# Patient Record
Sex: FEMALE | Race: BLACK OR AFRICAN AMERICAN | Employment: OTHER | URBAN - METROPOLITAN AREA
[De-identification: names, ages, dates, MRNs, and addresses within clinical notes are randomized per-mention and may not be internally consistent; named-entity substitution may affect disease eponyms.]

---

## 2024-01-23 ENCOUNTER — APPOINTMENT (OUTPATIENT)
Dept: GENERAL RADIOLOGY | Facility: HOSPITAL | Age: 64
End: 2024-01-23
Payer: MEDICAID

## 2024-01-23 ENCOUNTER — HOSPITAL ENCOUNTER (EMERGENCY)
Facility: HOSPITAL | Age: 64
Discharge: HOME | End: 2024-01-23
Attending: PEDIATRICS
Payer: MEDICAID

## 2024-01-23 VITALS
OXYGEN SATURATION: 98 % | WEIGHT: 155 LBS | HEIGHT: 65 IN | HEART RATE: 74 BPM | DIASTOLIC BLOOD PRESSURE: 107 MMHG | SYSTOLIC BLOOD PRESSURE: 185 MMHG | TEMPERATURE: 98 F | RESPIRATION RATE: 16 BRPM | BODY MASS INDEX: 25.83 KG/M2

## 2024-01-23 DIAGNOSIS — S62.111A CLOSED CHIP FRACTURE OF TRIQUETRUM OF RIGHT WRIST, INITIAL ENCOUNTER: Primary | ICD-10-CM

## 2024-01-23 PROCEDURE — 73110 X-RAY EXAM OF WRIST: CPT

## 2024-01-23 PROCEDURE — 29125 APPL SHORT ARM SPLINT STATIC: CPT

## 2024-01-23 PROCEDURE — 99284 EMERGENCY DEPT VISIT MOD MDM: CPT

## 2024-01-23 RX ORDER — LABETALOL 100 MG/1
100 TABLET, FILM COATED ORAL 2 TIMES DAILY
COMMUNITY

## 2024-01-23 RX ORDER — HYDRALAZINE HYDROCHLORIDE 100 MG/1
100 TABLET, FILM COATED ORAL 2 TIMES DAILY
COMMUNITY

## 2024-01-24 NOTE — ED PROVIDER NOTES
Patient Seen in: [facility] Emergency Department      History     Chief Complaint   Patient presents with    Wrist Injury     Stated Complaint: fall, right wrist injury    Subjective:   HPI    63-year-old female who is here with right wrist injury.  She was walking in the parking lot of [employer] when she slipped and fell on her hyperflexed right wrist.  No other injuries noted.  Noted swelling.  H/o HTN, I was able to review recent PCP visit for management of this.     Objective:   Past Medical History:   Diagnosis Date    Slipped intervertebral disc     Stroke (HCC) 2018    hemorraghic    Unspecified essential hypertension               Past Surgical History:   Procedure Laterality Date    OTHER SURGICAL HISTORY      D&C  X 4 per pt                Social History     Socioeconomic History    Marital status: Legally    Tobacco Use    Smoking status: Never    Smokeless tobacco: Never   Substance and Sexual Activity    Alcohol use: Yes     Comment: wine occas    Drug use: Yes     Comment: in past marijuana    Sexual activity: Yes     Partners: Male              Review of Systems    Positive for stated complaint: fall, right wrist injury  Other systems are as noted in HPI.  Constitutional and vital signs reviewed.      All other systems reviewed and negative except as noted above.    Physical Exam     ED Triage Vitals [01/23/24 1622]   BP (!) 187/102   Pulse 77   Resp 18   Temp 98.1 °F (36.7 °C)   Temp src Temporal   SpO2 98 %   O2 Device None (Room air)       Current:BP (!) 187/102   Pulse 77   Temp 98.1 °F (36.7 °C) (Temporal)   Resp 18   Ht 165.1 cm (5' 5\")   Wt 70.3 kg   SpO2 98%   BMI 25.79 kg/m²         Physical Exam  Vitals and nursing note reviewed.   Constitutional:       General: She is not in acute distress.     Appearance: She is well-developed. She is not diaphoretic.   HENT:      Head: Normocephalic and atraumatic.      Nose: Nose normal.   Eyes:      Pupils: Pupils are equal,  round, and reactive to light.   Cardiovascular:      Heart sounds: Normal heart sounds.   Pulmonary:      Effort: Pulmonary effort is normal.   Abdominal:      General: Abdomen is flat.   Musculoskeletal:         General: Swelling, tenderness and signs of injury present.      Cervical back: Normal range of motion and neck supple.      Comments: Mid aspect of right wrist tender with mild swelling.  No deformity.  Neurovascular intact   Skin:     General: Skin is warm.      Coloration: Skin is not pale.      Findings: No rash.   Neurological:      Mental Status: She is alert and oriented to person, place, and time.      Cranial Nerves: No cranial nerve deficit.      Motor: No abnormal muscle tone.      Coordination: Coordination normal.   Psychiatric:         Behavior: Behavior normal.         Thought Content: Thought content normal.         Judgment: Judgment normal.         ED Course   Labs Reviewed - No data to display          Medications administered:  Medications - No data to display    Pulse oximetry:  Pulse oximetry on room air is 98% and is normal.     Cardiac monitoring:  Initial heart rate is 77 and is normal for age    Vital signs:  Vitals:    01/23/24 1622   BP: (!) 187/102   Pulse: 77   Resp: 18   Temp: 98.1 °F (36.7 °C)   TempSrc: Temporal   SpO2: 98%   Weight: 70.3 kg   Height: 165.1 cm (5' 5\")     Chart review:  ^^ Review of prior external notes from unique sources (non-[facility] ED records):       Radiology:  Imaging independently visualized and interpreted by myself, along with review of radiology interpretation.   Noted following findings: noted avulsion fracture    XR WRIST COMPLETE (MIN 3 VIEWS), RIGHT (CPT=73110)    Result Date: 1/23/2024  CONCLUSION:  Triquetral avulsion fracture.   LOCATION:  [facility]   Dictated by (CST): Pawel Parker MD on 1/23/2024 at 5:40 PM     Finalized by (CST): Pawel Parker MD on 1/23/2024 at 5:41 PM        Splint Check:    The patient's splint (short arm) was checked after  placement and was noted to be in good placement.  Distal circulation and neurovascular exam was noted to be intact pre and post splint placement.           MDM      Assessment & Plan:    63 year old female with right wrist injury.  Tenderness to middle aspect of right wrist.  X-ray noted avulsion fracture of the triquetrum.  Short arm splint placed.  Motrin or Tylenol for pain.  Follow-up with orthopedics/hand        ^^ Independent historian: family member  ^^ Prescription drug and OTC medication management considerations: as noted above      Patient or caregiver understands the course of events that occurred in the emergency department. Instructed to return to emergency department or contact PCP for persistent, recurrent, or worsening symptoms.    This report has been produced using speech recognition software and may contain errors related to that system including, but not limited to, errors in grammar, punctuation, and spelling, as well as words and phrases that possibly may have been recognized inappropriately.  If there are any questions or concerns, contact the dictating provider for clarification.     NOTE: The 21st Century Cares Act makes medical notes available to patients.  Be advised that this is a medical document written in medical language and may contain abbreviations or verbiage that is unfamiliar or direct.  It is primarily intended to carry relevant historical information, physical exam findings, and the clinical assessment of the physician.                                    Medical Decision Making  Problems Addressed:  Closed chip fracture of triquetrum of right wrist, initial encounter: acute illness or injury    Amount and/or Complexity of Data Reviewed  Independent Historian:      Details: daughter  Radiology: ordered and independent interpretation performed. Decision-making details documented in ED Course.    Risk  OTC drugs.        Disposition and Plan     Clinical Impression:  1. Closed  chip fracture of triquetrum of right wrist, initial encounter         Disposition:  Discharge  1/23/2024  6:24 pm    Follow-up:  Josias Gibbs MD  [address]  [address] [address]  [phone]    Schedule an appointment as soon as possible for a visit            Medications Prescribed:  Current Discharge Medication List

## 2025-05-30 ENCOUNTER — APPOINTMENT (OUTPATIENT)
Dept: CT IMAGING | Facility: HOSPITAL | Age: 65
End: 2025-05-30
Attending: EMERGENCY MEDICINE

## 2025-05-30 ENCOUNTER — HOSPITAL ENCOUNTER (EMERGENCY)
Facility: HOSPITAL | Age: 65
Discharge: HOME | End: 2025-05-30
Attending: EMERGENCY MEDICINE

## 2025-05-30 VITALS
TEMPERATURE: 98 F | DIASTOLIC BLOOD PRESSURE: 107 MMHG | OXYGEN SATURATION: 100 % | RESPIRATION RATE: 18 BRPM | WEIGHT: 155 LBS | HEART RATE: 77 BPM | BODY MASS INDEX: 26 KG/M2 | SYSTOLIC BLOOD PRESSURE: 179 MMHG

## 2025-05-30 DIAGNOSIS — I10 ELEVATED BLOOD PRESSURE READING WITH DIAGNOSIS OF HYPERTENSION: ICD-10-CM

## 2025-05-30 DIAGNOSIS — F10.929 ALCOHOLIC INTOXICATION WITH COMPLICATION: ICD-10-CM

## 2025-05-30 DIAGNOSIS — R79.89 ELEVATED TROPONIN: ICD-10-CM

## 2025-05-30 DIAGNOSIS — R53.1 WEAKNESS GENERALIZED: Primary | ICD-10-CM

## 2025-05-30 LAB
ALBUMIN SERPL-MCNC: 4.4 G/DL (ref 3.2–4.8)
ALBUMIN/GLOB SERPL: 1.6 {RATIO} (ref 1–2)
ALP LIVER SERPL-CCNC: 69 U/L (ref 50–130)
ALT SERPL-CCNC: 18 U/L (ref 10–49)
ANION GAP SERPL CALC-SCNC: 10 MMOL/L (ref 0–18)
AST SERPL-CCNC: 32 U/L (ref ?–34)
ATRIAL RATE: 71 BPM
BASOPHILS # BLD AUTO: 0.02 X10(3) UL (ref 0–0.2)
BASOPHILS NFR BLD AUTO: 0.5 %
BILIRUB SERPL-MCNC: 0.4 MG/DL (ref 0.2–1.1)
BUN BLD-MCNC: 24 MG/DL (ref 9–23)
CALCIUM BLD-MCNC: 8.8 MG/DL (ref 8.7–10.6)
CHLORIDE SERPL-SCNC: 104 MMOL/L (ref 98–112)
CHOLEST SERPL-MCNC: 247 MG/DL (ref ?–200)
CO2 SERPL-SCNC: 25 MMOL/L (ref 21–32)
CREAT BLD-MCNC: 1.33 MG/DL (ref 0.55–1.02)
EGFRCR SERPLBLD CKD-EPI 2021: 45 ML/MIN/1.73M2 (ref 60–?)
EOSINOPHIL # BLD AUTO: 0.01 X10(3) UL (ref 0–0.7)
EOSINOPHIL NFR BLD AUTO: 0.2 %
ERYTHROCYTE [DISTWIDTH] IN BLOOD BY AUTOMATED COUNT: 14.6 %
ETHANOL SERPL-MCNC: 142 MG/DL (ref ?–3)
GLOBULIN PLAS-MCNC: 2.7 G/DL (ref 2–3.5)
GLUCOSE BLD-MCNC: 100 MG/DL (ref 70–99)
HCT VFR BLD AUTO: 36.6 % (ref 35–48)
HDLC SERPL-MCNC: 133 MG/DL (ref 40–59)
HGB BLD-MCNC: 12.5 G/DL (ref 12–16)
IMM GRANULOCYTES # BLD AUTO: 0.01 X10(3) UL (ref 0–1)
IMM GRANULOCYTES NFR BLD: 0.2 %
LDLC SERPL CALC-MCNC: 90 MG/DL (ref ?–100)
LYMPHOCYTES # BLD AUTO: 0.88 X10(3) UL (ref 1–4)
LYMPHOCYTES NFR BLD AUTO: 21.8 %
MAGNESIUM SERPL-MCNC: 2.1 MG/DL (ref 1.6–2.6)
MCH RBC QN AUTO: 29 PG (ref 26–34)
MCHC RBC AUTO-ENTMCNC: 34.2 G/DL (ref 31–37)
MCV RBC AUTO: 84.9 FL (ref 80–100)
MONOCYTES # BLD AUTO: 0.24 X10(3) UL (ref 0.1–1)
MONOCYTES NFR BLD AUTO: 6 %
NEUTROPHILS # BLD AUTO: 2.87 X10 (3) UL (ref 1.5–7.7)
NEUTROPHILS # BLD AUTO: 2.87 X10(3) UL (ref 1.5–7.7)
NEUTROPHILS NFR BLD AUTO: 71.3 %
NONHDLC SERPL-MCNC: 114 MG/DL (ref ?–130)
OSMOLALITY SERPL CALC.SUM OF ELEC: 292 MOSM/KG (ref 275–295)
P AXIS: 70 DEGREES
P-R INTERVAL: 174 MS
PLATELET # BLD AUTO: 228 10(3)UL (ref 150–450)
POTASSIUM SERPL-SCNC: 4 MMOL/L (ref 3.5–5.1)
PROT SERPL-MCNC: 7.1 G/DL (ref 5.7–8.2)
Q-T INTERVAL: 406 MS
QRS DURATION: 90 MS
QTC CALCULATION (BEZET): 441 MS
R AXIS: 12 DEGREES
RBC # BLD AUTO: 4.31 X10(6)UL (ref 3.8–5.3)
SODIUM SERPL-SCNC: 139 MMOL/L (ref 136–145)
T AXIS: 121 DEGREES
TRIGL SERPL-MCNC: 150 MG/DL (ref 30–149)
TROPONIN I SERPL HS-MCNC: 83 NG/L (ref ?–34)
VENTRICULAR RATE: 71 BPM
VLDLC SERPL CALC-MCNC: 24 MG/DL (ref 0–30)
WBC # BLD AUTO: 4 X10(3) UL (ref 4–11)

## 2025-05-30 PROCEDURE — 85025 COMPLETE CBC W/AUTO DIFF WBC: CPT | Performed by: EMERGENCY MEDICINE

## 2025-05-30 PROCEDURE — 36415 COLL VENOUS BLD VENIPUNCTURE: CPT

## 2025-05-30 PROCEDURE — 70450 CT HEAD/BRAIN W/O DYE: CPT | Performed by: EMERGENCY MEDICINE

## 2025-05-30 PROCEDURE — 84484 ASSAY OF TROPONIN QUANT: CPT | Performed by: EMERGENCY MEDICINE

## 2025-05-30 PROCEDURE — 99285 EMERGENCY DEPT VISIT HI MDM: CPT

## 2025-05-30 PROCEDURE — 83735 ASSAY OF MAGNESIUM: CPT | Performed by: EMERGENCY MEDICINE

## 2025-05-30 PROCEDURE — 80061 LIPID PANEL: CPT | Performed by: EMERGENCY MEDICINE

## 2025-05-30 PROCEDURE — 80053 COMPREHEN METABOLIC PANEL: CPT | Performed by: EMERGENCY MEDICINE

## 2025-05-30 PROCEDURE — 93005 ELECTROCARDIOGRAM TRACING: CPT

## 2025-05-30 PROCEDURE — 82077 ASSAY SPEC XCP UR&BREATH IA: CPT | Performed by: EMERGENCY MEDICINE

## 2025-05-30 PROCEDURE — 93010 ELECTROCARDIOGRAM REPORT: CPT

## 2025-05-30 RX ORDER — LABETALOL HYDROCHLORIDE 5 MG/ML
20 INJECTION, SOLUTION INTRAVENOUS ONCE
Status: DISCONTINUED | OUTPATIENT
Start: 2025-05-30 | End: 2025-05-30

## 2025-05-30 NOTE — ED PROVIDER NOTES
Patient Seen in: [facility] Emergency Department        History  Chief Complaint   Patient presents with    Numbness Weakness     Stated Complaint: FAMILY CONCERNED FOR STROKE SYMPTOMS, UNBALANCED AND SLURRED SPEECH COUPLE HOUR*    Subjective:   64-year-old female, presents with daughter with concern for possible strokelike symptoms.  Around 11:30 PM this past evening she was at home, she was checking on her leg she had a cramp and her daughter brought her over her chair.  States that she, missed the chair and slid onto the ground which was unlike her.  She then can get up because she has bad knees so they called EMS and they got her up and checked her on the side everything looked okay.  Then she was up in the kitchen cooking right afterwards and had to hold onto both countertops the same time because she felt a little bit off balance.  Patient states this because she had a \"my tie.\"  States only had 1 drink but can make her feel little bit loopy and daughter says that she has my ties all the time and that should not cause the symptoms.  Daughter said that her speech is off little bit but is fully back to normal now.  They brought her in for evaluation because she has a history of TIAs in the past.  She is not on blood thinners.  No falls.  No blunt trauma.  NIH is 0 and she says she feels completely fine right now.                      Objective:     Past Medical History:    Slipped intervertebral disc    Stroke (HCC)    hemorraghic    Unspecified essential hypertension              Past Surgical History:   Procedure Laterality Date    Other surgical history      D&C  X 4 per pt                Social History     Socioeconomic History    Marital status:    Tobacco Use    Smoking status: Never    Smokeless tobacco: Never   Substance and Sexual Activity    Alcohol use: Yes     Comment: wine occas    Drug use: Yes     Comment: in past marijuana    Sexual activity: Yes     Partners: Male     Social  Drivers of Health     Food Insecurity: Not on File (2024)    Received from [employer]    Food Insecurity     Food: 0   Transportation Needs: Not on File (10/6/2022)    Received from [employer]    Transportation Needs     Transportation: 0   Housing Stability: Not on File (10/6/2022)    Received from [employer]    Housing Stability     Housin                                Physical Exam    ED Triage Vitals [25 0211]   BP (!) 170/106   Pulse 73   Resp 18   Temp 98.2 °F (36.8 °C)   Temp src Oral   SpO2 98 %   O2 Device None (Room air)       Current Vitals:   Vital Signs  BP: (!) 179/107  Pulse: 77  Resp: 18  Temp: 98.2 °F (36.8 °C)  Temp src: Oral  MAP (mmHg): (!) 129    Oxygen Therapy  SpO2: 100 %  O2 Device: None (Room air)            Physical Exam  Vitals and nursing note reviewed.   Constitutional:       Appearance: She is not toxic-appearing.   HENT:      Head: Normocephalic and atraumatic.   Eyes:      General: No visual field deficit.     Extraocular Movements: Extraocular movements intact.      Right eye: Normal extraocular motion and no nystagmus.      Left eye: Normal extraocular motion and no nystagmus.      Pupils: Pupils are equal, round, and reactive to light.   Cardiovascular:      Rate and Rhythm: Normal rate.   Pulmonary:      Effort: Pulmonary effort is normal.   Abdominal:      Palpations: Abdomen is soft.   Musculoskeletal:      Cervical back: Normal range of motion and neck supple.   Neurological:      Mental Status: She is alert.      GCS: GCS eye subscore is 4. GCS verbal subscore is 5. GCS motor subscore is 6.      Cranial Nerves: No cranial nerve deficit, dysarthria or facial asymmetry.      Sensory: No sensory deficit.      Motor: No weakness.      Coordination: Coordination normal.      Deep Tendon Reflexes: Reflexes normal.   Psychiatric:         Mood and Affect: Mood normal.         Behavior: Behavior normal.       NIHSS 0          ED Course  Labs Reviewed   COMP METABOLIC PANEL (14) -  Abnormal; Notable for the following components:       Result Value    Glucose 100 (*)     BUN 24 (*)     Creatinine 1.33 (*)     eGFR-Cr 45 (*)     All other components within normal limits   CBC WITH DIFFERENTIAL WITH PLATELET - Abnormal; Notable for the following components:    Lymphocyte Absolute 0.88 (*)     All other components within normal limits   TROPONIN I HIGH SENSITIVITY - Abnormal; Notable for the following components:    Troponin I (High Sensitivity) 83 (*)     All other components within normal limits   ETHYL ALCOHOL - Abnormal; Notable for the following components:    Ethyl Alcohol 142 (*)     All other components within normal limits   LIPID PANEL - Abnormal; Notable for the following components:    Cholesterol, Total 247 (*)     HDL Cholesterol 133 (*)     Triglycerides 150 (*)     All other components within normal limits   MAGNESIUM - Normal   RAINBOW DRAW LAVENDER   RAINBOW DRAW LIGHT GREEN   RAINBOW DRAW BLUE   RAINBOW DRAW GOLD   UA HOLD     EKG    Rate, intervals and axes as noted on EKG Report.  Rate: 71  Rhythm: Sinus Rhythm  Reading: EKG sinus rhythm 71 bpm.  Normal axis.  , QRS 90,  ms.  There are no previous EKGs to view to compare to.  Hide lateral T wave inversions.  Slight concavity of the ST segment in lead V2    Patient placed on cardiac monitor for telemetry monitoring secondary to weakness/dizziness. Interpretation at bedside by me is sinus rhythm.                                  MDM     I independently interpreted the CT the brain without any obvious signs of acute hemorrhage    Differential diagnosis includes, but not limited to, CVA, TIA, intoxication, dehydration, electro disturbance    External chart review demonstrates her outpatient VNA visits recently and historically    Daughter at bedside helpful to provide information on the history of presenting illness    64-year-old female who had some weakness after sitting awkwardly in the chair and slid to the ground.   At our time getting up because of her chronic knee pain.  Then was off balance a bit in the kitchen while cooking.  Alcohol in the 140s.  The several hours after her last drink.  I suspect her alcohol of a must of been higher at the time.  She believes is from that.  The daughter was concerned because of history of TIA.  NIH is 0 upon arrival and has remained 0.  She is hypertensive but does take labetalol at night before bed and does not want a dose he would rather take it at home.  Did explain her elevated troponin of 83 her workup for EKG her CT brain and her rest of her blood work.  Did offer consider discussed admission to the hospital, trending troponins, see cardiology and neurology, possible MRI etc.  Daughter and patient both states she been completely at her baseline since the episode at home and she wants to go home.  Patient still firmly believes is from the nighttime that she had.  Daughter \"just wanted to make sure.\"  Discussed everything with them.  Again offered admission to the hospital further workup evaluation and monitoring of further workup here tonight.  They want to go home, shared decision made utilized and return precaution provided.  Patient states she has to get home and get the bed because she is go to work in the morning.  Discussed potentional neurogenic cardiogenic metabolic and other causes of her symptoms    Patient was screened and evaluated during this visit.  As the treating physician attending to the patient, I determined within reasonable clinical confidence and prior to discharge, that an emergency medical condition was not or was no longer present.  There was no indication for further evaluation, treatment, or admission on an emergency basis.  Comprehensive verbal and written discharge and follow-up instructions were provided to help prevent relapse or worsening.  Patient was instructed to follow-up with their primary care provider for further evaluation and treatment, return  immediately to ER for worsening, concerning, new, or changing/persisting symptoms. I discussed the case with the patient and they had no questions, complaints, or concerns.  Patient was comfortable going home.     Per the discharge paperwork, patients are encouraged to and given instructions on how to sign up for MyChart, where they have access to their records, including any/all incidental findings.     This note was prepared using Dragon Medical voice recognition dictation software. As a result errors may occur. When identified these errors have been corrected. While every attempt is made to correct errors during dictation discrepancies may still exist    Note to patient: The 21st Century Cures Act makes medical notes like these available to patients in the interest of transparency. However, this is a medical document intended as peer to peer communication. It is written in medical language and may contain abbreviations or verbiage that are unfamiliar. It may appear blunt or direct. Medical documents are intended to carry relevant information, facts as evident, and the clinical opinion of the practitioner.           Medical Decision Making      Disposition and Plan     Clinical Impression:  1. Weakness generalized    2. Elevated troponin    3. Alcoholic intoxication with complication    4. Elevated blood pressure reading with diagnosis of hypertension         Disposition:  Discharge  5/30/2025  3:15 am    Follow-up:  [facility] Emergency Department  [address] [facility] [address] [phone]  Follow up  As needed    [facility]  [address]  [facility] [address] [phone]  Follow up  As needed    [facility]  [address]  [facility] [address] [phone]  Follow up  As needed          Medications Prescribed:  Current Discharge Medication List                Supplementary Documentation:

## 2025-05-30 NOTE — ED QUICK NOTES
Pt returned from CT. Ambulated to bathroom. Resting in ED cart and reattached to monitors. No needs at this time.

## 2025-05-30 NOTE — ED INITIAL ASSESSMENT (HPI)
Family reports that patient has been having balance problems and \"her speech pattern seemed off\" earlier today, but speech has resolved. Family reports that patient has history of having a stroke and had symptoms similar to this the day before her prior stroke.